# Patient Record
Sex: FEMALE | Race: WHITE | NOT HISPANIC OR LATINO | URBAN - METROPOLITAN AREA
[De-identification: names, ages, dates, MRNs, and addresses within clinical notes are randomized per-mention and may not be internally consistent; named-entity substitution may affect disease eponyms.]

---

## 2021-04-15 DIAGNOSIS — Z23 ENCOUNTER FOR IMMUNIZATION: ICD-10-CM

## 2022-10-11 ENCOUNTER — HOSPITAL ENCOUNTER (OUTPATIENT)
Facility: CLINIC | Age: 42
Discharge: HOME | End: 2022-10-11
Attending: FAMILY MEDICINE
Payer: COMMERCIAL

## 2022-10-11 VITALS
RESPIRATION RATE: 18 BRPM | SYSTOLIC BLOOD PRESSURE: 129 MMHG | HEIGHT: 62 IN | WEIGHT: 115 LBS | DIASTOLIC BLOOD PRESSURE: 87 MMHG | TEMPERATURE: 97.1 F | HEART RATE: 118 BPM | BODY MASS INDEX: 21.16 KG/M2 | OXYGEN SATURATION: 97 %

## 2022-10-11 DIAGNOSIS — N75.0 BARTHOLIN CYST: Primary | ICD-10-CM

## 2022-10-11 PROCEDURE — 99203 OFFICE O/P NEW LOW 30 MIN: CPT | Performed by: NURSE PRACTITIONER

## 2022-10-11 PROCEDURE — S9083 URGENT CARE CENTER GLOBAL: HCPCS | Performed by: NURSE PRACTITIONER

## 2022-10-11 RX ORDER — DOXYCYCLINE 100 MG/1
100 CAPSULE ORAL 2 TIMES DAILY
Qty: 14 CAPSULE | Refills: 0 | Status: SHIPPED | OUTPATIENT
Start: 2022-10-11 | End: 2022-10-18

## 2022-10-11 RX ORDER — AMOXICILLIN AND CLAVULANATE POTASSIUM 875; 125 MG/1; MG/1
1 TABLET, FILM COATED ORAL 2 TIMES DAILY
Qty: 14 TABLET | Refills: 0 | Status: SHIPPED | OUTPATIENT
Start: 2022-10-11 | End: 2022-10-18

## 2022-10-11 RX ORDER — VALSARTAN 160 MG/1
160 TABLET ORAL DAILY
COMMUNITY

## 2022-10-11 RX ORDER — CETIRIZINE HYDROCHLORIDE 10 MG/1
10 TABLET ORAL DAILY
COMMUNITY
End: 2022-10-11

## 2022-10-11 RX ORDER — FLUCONAZOLE 150 MG/1
150 TABLET ORAL DAILY
Qty: 2 TABLET | Refills: 0 | Status: SHIPPED | OUTPATIENT
Start: 2022-10-11 | End: 2022-10-12

## 2022-10-11 NOTE — DISCHARGE INSTRUCTIONS
You have been prescribed an antibiotic. Once started, please complete full course of antibiotic to prevent creating resistance within the bugs causing your  Illness. Please eat a daily greek yogurt OR take a daily Probiotic for GI health while taking antibiotics.  Start probiotics no sooner than 4 hours after antibiotics to ensure effectiveness.   For your safety, please do not drink alcohol while taking antibiotics for 7-10 days after finishing.   ___    400-600 mg Motrin/Ibuprofen/Advil PLUS 650-1000mg Tylenol every 8 hours  Warm compress and or sitz bath 15-20 mins 3-4 times a day  Monitor for fever ( 101F+) and worsening symptoms. Go to nearest ER if you develop any of these.   ____  If you develop a yeast infection please do not take Diflucan until you have finished antibiotics or have 2-3 days left

## 2022-10-11 NOTE — ED ATTESTATION NOTE
I agree with the PA/NPs history, physical, assessment, and plan of care, with the following exceptions: None     Marium James,   10/11/22 8144

## 2022-10-11 NOTE — ED PROVIDER NOTES
"Emergency Medicine Note  HPI   HISTORY OF PRESENT ILLNESS     Intermittent lump to R labia for \" months\"   Worsening over the last 2-3 days \" with a white center\"   Pain is worsening and localized.   No GYN f/u for a few weeks.   Denies fever, use of razor.   No hx of bartholian cyst    Pt denies any and all other new, worsening or associated signs sx or complaints.                Patient History   PAST HISTORY     Reviewed from Nursing Triage:       No past medical history on file.    No past surgical history on file.    No family history on file.           Review of Systems   REVIEW OF SYSTEMS     Review of Systems   Genitourinary: Positive for vaginal pain.   All other systems reviewed and are negative.        VITALS     ED Vitals    Date/Time Temp Pulse Resp BP SpO2 [facility]   10/11/22 1108 36.2 °C (97.1 °F) 118 -- 129/87 97 % PS   10/11/22 1107 -- -- 18 -- -- PS                       Physical Exam   PHYSICAL EXAM     Physical Exam  Vitals reviewed. Exam conducted with a chaperone present.   Constitutional:       General: She is not in acute distress.     Appearance: Normal appearance. She is well-developed. She is not ill-appearing or toxic-appearing.   HENT:      Head: Normocephalic.      Mouth/Throat:      Mouth: Mucous membranes are moist.      Pharynx: Oropharynx is clear.   Cardiovascular:      Rate and Rhythm: Normal rate and regular rhythm.      Heart sounds: Normal heart sounds.   Pulmonary:      Effort: Pulmonary effort is normal. No tachypnea, accessory muscle usage or respiratory distress.      Breath sounds: Normal breath sounds. No stridor. No decreased breath sounds, wheezing, rhonchi or rales.   Chest:      Chest wall: No tenderness, crepitus or edema.   Abdominal:      Palpations: Abdomen is soft.   Genitourinary:     Exam position: Supine.      Pubic Area: No rash or pubic lice.       Labia:         Right: Tenderness present.           Comments: Chaperone Liza HUTCHINS present.   Patterson size lump to " inner R labia with TTP and a 1-2 mm white center.   No d.c or drainage.     Musculoskeletal:      Cervical back: Normal range of motion.   Skin:     General: Skin is warm and dry.      Capillary Refill: Capillary refill takes less than 2 seconds.      Findings: No rash.   Neurological:      Mental Status: She is alert and oriented to person, place, and time.   Psychiatric:         Mood and Affect: Mood normal.           PROCEDURES     Procedures     DATA     Results     None              No orders to display       Scoring tools                                  ED Course & MDM   MDM / ED COURSE / CLINICAL IMPRESSION / DISPO     MDM  Number of Diagnoses or Management Options  Bartholin cyst  Diagnosis management comments: Suspect infected Bartholian cyst based on HPI and PE.     Preferred regimen of bactrim and Augmentin per Uptodate however pt on Valsartan which interacts with bactrim  Will switch to Doxy and Augmentin BID x 7 days as recommended    Pt given info for GYN in [facility] and [facility] a [facility] and is unable to be accommodated by [facility] or PCP.   Written rx for diflucan given as pt will be on 2 abx for dx.     See dispo for all other recommendations, education and instructions  Pt given strict instructions to seek emergent medical tx if sx worsen and displayed verbal understanding of d/c instructions and all questions were answered at time of d/c from .       Patient Progress  Patient progress: stable         Clinical Impression      Bartholin cyst     Disposition  Discharge         Fozia Rodriguez, FNP  10/11/22 0424